# Patient Record
Sex: MALE | Race: WHITE | Employment: FULL TIME | URBAN - METROPOLITAN AREA
[De-identification: names, ages, dates, MRNs, and addresses within clinical notes are randomized per-mention and may not be internally consistent; named-entity substitution may affect disease eponyms.]

---

## 2019-01-09 ENCOUNTER — APPOINTMENT (OUTPATIENT)
Dept: ULTRASOUND IMAGING | Facility: CLINIC | Age: 34
End: 2019-01-09
Payer: COMMERCIAL

## 2019-01-09 ENCOUNTER — HOSPITAL ENCOUNTER (EMERGENCY)
Facility: CLINIC | Age: 34
Discharge: HOME | End: 2019-01-09
Attending: EMERGENCY MEDICINE | Admitting: EMERGENCY MEDICINE
Payer: COMMERCIAL

## 2019-01-09 VITALS
TEMPERATURE: 98.7 F | RESPIRATION RATE: 20 BRPM | OXYGEN SATURATION: 96 % | HEART RATE: 75 BPM | SYSTOLIC BLOOD PRESSURE: 138 MMHG | BODY MASS INDEX: 41.04 KG/M2 | DIASTOLIC BLOOD PRESSURE: 79 MMHG | HEIGHT: 72 IN | WEIGHT: 303 LBS

## 2019-01-09 DIAGNOSIS — R60.0 BILATERAL EDEMA OF LOWER EXTREMITY: ICD-10-CM

## 2019-01-09 LAB
ALBUMIN SERPL-MCNC: 3.7 G/DL (ref 3.4–5)
ALBUMIN UR-MCNC: NEGATIVE MG/DL
ALP SERPL-CCNC: 70 U/L (ref 40–150)
ALT SERPL W P-5'-P-CCNC: 49 U/L (ref 0–70)
ANION GAP SERPL CALCULATED.3IONS-SCNC: 8 MMOL/L (ref 3–14)
APPEARANCE UR: CLEAR
AST SERPL W P-5'-P-CCNC: 25 U/L (ref 0–45)
BILIRUB SERPL-MCNC: 0.3 MG/DL (ref 0.2–1.3)
BILIRUB UR QL STRIP: NEGATIVE
BUN SERPL-MCNC: 14 MG/DL (ref 7–30)
CALCIUM SERPL-MCNC: 8.4 MG/DL (ref 8.5–10.1)
CHLORIDE SERPL-SCNC: 107 MMOL/L (ref 94–109)
CO2 SERPL-SCNC: 25 MMOL/L (ref 20–32)
COLOR UR AUTO: YELLOW
CREAT SERPL-MCNC: 0.82 MG/DL (ref 0.66–1.25)
GFR SERPL CREATININE-BSD FRML MDRD: >90 ML/MIN/{1.73_M2}
GLUCOSE SERPL-MCNC: 97 MG/DL (ref 70–99)
GLUCOSE UR STRIP-MCNC: NEGATIVE MG/DL
HGB UR QL STRIP: NEGATIVE
KETONES UR STRIP-MCNC: NEGATIVE MG/DL
LEUKOCYTE ESTERASE UR QL STRIP: NEGATIVE
MUCOUS THREADS #/AREA URNS LPF: PRESENT /LPF
NITRATE UR QL: NEGATIVE
NT-PROBNP SERPL-MCNC: 123 PG/ML (ref 0–450)
PH UR STRIP: 6 PH (ref 5–7)
POTASSIUM SERPL-SCNC: 3.7 MMOL/L (ref 3.4–5.3)
PROT SERPL-MCNC: 7.8 G/DL (ref 6.8–8.8)
RBC #/AREA URNS AUTO: 1 /HPF (ref 0–2)
SODIUM SERPL-SCNC: 140 MMOL/L (ref 133–144)
SOURCE: ABNORMAL
SP GR UR STRIP: 1.02 (ref 1–1.03)
SQUAMOUS #/AREA URNS AUTO: <1 /HPF (ref 0–1)
UROBILINOGEN UR STRIP-MCNC: 0 MG/DL (ref 0–2)
WBC #/AREA URNS AUTO: 1 /HPF (ref 0–5)

## 2019-01-09 PROCEDURE — 83880 ASSAY OF NATRIURETIC PEPTIDE: CPT | Performed by: EMERGENCY MEDICINE

## 2019-01-09 PROCEDURE — 80053 COMPREHEN METABOLIC PANEL: CPT | Performed by: EMERGENCY MEDICINE

## 2019-01-09 PROCEDURE — 81001 URINALYSIS AUTO W/SCOPE: CPT | Performed by: EMERGENCY MEDICINE

## 2019-01-09 PROCEDURE — 93970 EXTREMITY STUDY: CPT

## 2019-01-09 PROCEDURE — 99284 EMERGENCY DEPT VISIT MOD MDM: CPT | Mod: 25

## 2019-01-09 RX ORDER — FUROSEMIDE 20 MG
20 TABLET ORAL DAILY
Qty: 5 TABLET | Refills: 0 | Status: SHIPPED | OUTPATIENT
Start: 2019-01-09 | End: 2019-01-14

## 2019-01-09 RX ORDER — OMEGA-3 FATTY ACIDS/FISH OIL 300-1000MG
200 CAPSULE ORAL EVERY 4 HOURS PRN
COMMUNITY

## 2019-01-09 ASSESSMENT — ENCOUNTER SYMPTOMS
SHORTNESS OF BREATH: 0
ARTHRALGIAS: 1
JOINT SWELLING: 1
MYALGIAS: 0
LIGHT-HEADEDNESS: 0
BACK PAIN: 1
FREQUENCY: 0
FATIGUE: 0
NUMBNESS: 0
FEVER: 0
UNEXPECTED WEIGHT CHANGE: 0

## 2019-01-09 ASSESSMENT — MIFFLIN-ST. JEOR: SCORE: 2352.4

## 2019-01-09 NOTE — LETTER
January 9, 2019      To Whom It May Concern:      Mahesh SPRAGUE Lloyd was seen in our Emergency Department today, 01/09/19.  I expect his condition to improve over the next two to three days.  He may return to work/school when improved.    Sincerely,

## 2019-01-09 NOTE — ED AVS SNAPSHOT
[facility]  [address]  [facility] [address]  Phone:  [phone]  Fax:  [phone]                                    Mahesh Desai   MRN: [id number]    Department:  [facility]   Date of Visit:  1/9/2019           After Visit Summary Signature Page    I have received my discharge instructions, and my questions have been answered. I have discussed any challenges I see with this plan with the nurse or doctor.    ..........................................................................................................................................  Patient/Patient Representative Signature      ..........................................................................................................................................  Patient Representative Print Name and Relationship to Patient    ..................................................               ................................................  Date                                   Time    ..........................................................................................................................................  Reviewed by Signature/Title    ...................................................              ..............................................  Date                                               Time          22EPIC Rev 08/18

## 2019-01-10 NOTE — ED PROVIDER NOTES
History     Chief Complaint:  Abnormal Labs & Leg Swelling      HPI   Mahesh Desai is an otherwise healthy 33 year old male who presents to the ED for evaluation of abnormal labs and leg swelling. The patient reports that he tripped and fell at the [employer] [facility] [place] at the end of December. He felt fine later that day and denies any significant trauma. The next day, the patient states that he developed some slight ankle soreness and swelling, which has progressively increased since then. The patient has tried applying ACE bandages, elevating, and icing his legs without improvement. He presented to the [place] urgent care today for evaluation, at which time he obtained an elevated D dimer and other labs as below. He also obtained ankle x-rays without evidence for fracture, and the patient was referred to the ED for further imaging and workup. Here, the patient additionally reports some back pain secondary to a second fall earlier this month. He denies any chest pain, shortness of breath, lightheadedness, syncope, fatigue, fevers, or recent illnesses. He has been eating and drinking well and denies any urinary frequency or decreased urine. The patient also denies any other swelling to his hands or face. He has not had any recent weight changes, and he denies any numbness, tingling, or pain to his calves, thighs, or groin.    [facility] Lab Results:  D-dimer: 1230.0 (H)  CBC: WBC: 5.5, HGB: 13.2 (L), PLT: 340  BMP: Glucose 110 (H), o/w WNL (Creatinine: 0.93)     Allergies:  NKDA    Medications:    Excedrin     Past Medical History:    Migraines  Obesity    Past Surgical History:    The patient does not have any pertinent past surgical history.    Family History:    No past pertinent family history.    Social History:  Negative for tobacco use.  Negative for alcohol use.  Marital Status:    Presents to the ED with his wife.     Review of Systems   Constitutional: Negative for fatigue, fever  and unexpected weight change.   Respiratory: Negative for shortness of breath.    Cardiovascular: Negative for chest pain.   Genitourinary: Negative for decreased urine volume, frequency and penile pain.   Musculoskeletal: Positive for arthralgias, back pain and joint swelling. Negative for myalgias.   Neurological: Negative for syncope, light-headedness and numbness.   All other systems reviewed and are negative.      Physical Exam     Patient Vitals for the past 24 hrs:   BP Temp Temp src Pulse Heart Rate Resp SpO2 Height Weight   01/09/19 2210 -- -- -- -- -- -- 96 % -- --   01/09/19 2209 -- -- -- -- -- -- 96 % -- --   01/09/19 2208 -- -- -- -- -- -- 95 % -- --   01/09/19 2207 -- -- -- -- -- -- 96 % -- --   01/09/19 2206 -- -- -- -- -- -- 97 % -- --   01/09/19 2205 -- -- -- -- -- -- 95 % -- --   01/09/19 2204 -- -- -- -- -- -- 96 % -- --   01/09/19 2203 -- -- -- -- -- -- 96 % -- --   01/09/19 2202 -- -- -- -- -- -- 94 % -- --   01/09/19 2200 138/79 -- -- 75 -- -- 96 % -- --   01/09/19 2155 -- -- -- -- -- -- 96 % -- --   01/09/19 2150 -- -- -- -- -- -- 95 % -- --   01/09/19 2145 133/83 -- -- 72 -- -- 96 % -- --   01/09/19 2140 -- -- -- -- -- -- 96 % -- --   01/09/19 2135 -- -- -- -- -- -- 96 % -- --   01/09/19 2130 138/87 -- -- 80 -- -- 97 % -- --   01/09/19 2125 -- -- -- -- -- -- 95 % -- --   01/09/19 2120 146/88 -- -- 78 -- -- -- -- --   01/09/19 2050 -- -- -- -- -- -- 97 % -- --   01/09/19 2025 -- -- -- -- -- -- 95 % -- --   01/09/19 2020 -- -- -- -- -- -- 96 % -- --   01/09/19 2015 (!) 140/94 -- -- 74 -- -- 96 % -- --   01/09/19 2010 -- -- -- -- -- -- 96 % -- --   01/09/19 2005 (!) 136/91 -- -- 82 -- -- 94 % -- --   01/09/19 2000 (!) 148/98 -- -- -- -- -- 94 % -- --   01/09/19 1958 (!) 148/98 98.7  F (37.1  C) Oral -- 87 20 91 % 1.829 m (6') 137.4 kg (303 lb)        Physical Exam  General: Well appearing, nontoxic. Resting comfortably  Head:  Scalp, face, and head appear normal  Eyes:  Pupils are equal, round,  and reactive to light    Conjunctivae non-injected and sclerae white  ENT:    The external nose is normal    Pinnae are normal    The oropharynx is normal, mucous membranes moist    Uvula is in the midline  Neck:  Normal range of motion    There is no rigidity noted    Trachea is in the midline  CV:  Regular rate and rhythm     Normal S1/S2, no S3/S4    No murmur or rub. Radial pulses 2+ bilaterally. DP pulses 2+ and intact bilaterally.   Resp:  Lungs are clear and equal bilaterally    There is no tachypnea    No increased work of breathing    No rales, wheezing, or rhonchi  GI:  Abdomen is soft, obese, no rigidity or guarding    No distension, or mass    No tenderness or rebound tenderness   MS:  Normal muscular tone    Symmetric motor strength    Mild nonpitting edema to the bilateral ankles. Ankles with full ROM and no tenderness or evidence of trauma. CMS intact bilateral feet. No calf swelling or tenderness.  Skin:  No rash or acute skin lesions noted  Neuro: Awake and alert    Speech is normal and fluent    Moves all extremities spontaneously  Psych:  Normal affect.  Appropriate interactions.     Emergency Department Course   Imaging:  Radiographic findings were communicated with the patient who voiced understanding of the findings.    US Lower Extremity Venous Duplex, Bilateral:  No deep venous thrombosis demonstrated, as per radiology.      Laboratory:  CMP: Calcium 8.4 (L), o/w WNL (Creatinine: 0.82)    BNP: 123     UA with Microscopic: Mucous present (A), o/w WNL     Emergency Department Course:  Nursing notes and vitals reviewed. (2016) I performed an exam of the patient as documented above.     Blood drawn. This was sent to the lab for further testing, results above. The patient provided a urine sample here in the emergency department. This was sent for laboratory testing, findings above.      The patient was sent for a bilateral lower extremity US while in the emergency department, findings above.      (2128) I rechecked the patient and discussed the results of his workup thus far.     Findings and plan explained to the Patient. Patient discharged home with instructions regarding supportive care, medications, and reasons to return. The importance of close follow-up was reviewed. Patient was prescribed Lasix.    I personally reviewed the laboratory results with the Patient and answered all related questions prior to discharge.      Impression & Plan    Medical Decision Making:  Mahesh Desai is a 33 year old otherwise healthy male who presents as noted above for bilateral lower extremity edema. Evaluation at an outside facility revealed elevated d dimer. On my evaluation, the patient is well appearing, hemodynamically stable. He denies any chest pain or shortness of breath whatsoever. No dizziness, lightheadedness, or near syncope or syncope. The patient has minimal swelling of the bilateral ankles. No calf pain or tenderness. Broad differential diagnosis is considered including dependent edema, veinous insufficiency, DVT, nephrotic syndrome. There is no erythema or induration or warmth that would suggest cellulitis or soft tissue infection. It would be unusual for this to represent gout. The patient is not having any significant pain. Other rare and unusual conditions such as nephrotic syndrome and CHF are also considered. EKG was reviewed from the outside facility. This was normal without evidence of heart strain or ischemia. No dysrhythmia. Total protein was normal. Urinalysis is negative for proteinuria or other findings. Bilateral lower extremity US is negative for DVT or other acute findings. BNP is normal which makes CHF unlikely. Ultimately I feel that the patient has a benign cause for his lower extremity swelling. I would not pursue CTA of the chest at this point given the lack of any chest symptoms whatsoever. I will start the patient on 5 days of a small dose of Lasix. I recommended continuing  compression stockings and elevation, along with close follow up with PCP. Patient is agreeable with the plan of care and was discharged in stable condition.    Diagnosis:    ICD-10-CM    1. Bilateral edema of lower extremity R60.0        Disposition:  discharged to home    Discharge Medications:     Medication List      Started    furosemide 20 MG tablet  Commonly known as:  LASIX  20 mg, Oral, DAILY            Scribe Disclosure:  I, Christen Davis, am serving as a scribe on 1/9/2019 at 8:16 PM to personally document services performed by Almas Guajardo MD based on my observations and the provider's statements to me.      Christen Davis  1/9/2019   [facility] EMERGENCY DEPARTMENT       Almas Guajardo MD  01/11/19 [id number]

## 2019-01-10 NOTE — ED TRIAGE NOTES
Pt has bilateral foot and ankle swelling for past 2 weeks and it has worsened.  Elevated d dimer found at [facility] and pt sent to ED.